# Patient Record
Sex: FEMALE | Race: BLACK OR AFRICAN AMERICAN | Employment: OTHER | URBAN - METROPOLITAN AREA
[De-identification: names, ages, dates, MRNs, and addresses within clinical notes are randomized per-mention and may not be internally consistent; named-entity substitution may affect disease eponyms.]

---

## 2018-09-26 ENCOUNTER — HOSPITAL ENCOUNTER (OUTPATIENT)
Dept: SLEEP MEDICINE | Age: 73
Discharge: HOME | End: 2018-09-26
Attending: PSYCHIATRY & NEUROLOGY
Payer: MEDICARE

## 2018-09-26 DIAGNOSIS — G62.9 POLYNEUROPATHY: ICD-10-CM

## 2018-09-26 DIAGNOSIS — E78.5 HYPERLIPIDEMIA: ICD-10-CM

## 2018-09-26 DIAGNOSIS — K21.9 ESOPHAGEAL REFLUX: ICD-10-CM

## 2018-09-26 DIAGNOSIS — G47.33 OSA (OBSTRUCTIVE SLEEP APNEA): ICD-10-CM

## 2018-09-26 DIAGNOSIS — F32.A DEPRESSION: ICD-10-CM

## 2018-09-26 DIAGNOSIS — E66.09 EXOGENOUS OBESITY: ICD-10-CM

## 2018-09-26 DIAGNOSIS — I10 BENIGN ESSENTIAL HYPERTENSION: ICD-10-CM

## 2018-09-26 DIAGNOSIS — G47.00 PERSISTENT INSOMNIA: ICD-10-CM

## 2018-09-26 DIAGNOSIS — I50.9 CHF (CONGESTIVE HEART FAILURE) (HCC): ICD-10-CM

## 2018-09-26 DIAGNOSIS — J44.9 COPD (CHRONIC OBSTRUCTIVE PULMONARY DISEASE) (HCC): ICD-10-CM

## 2018-09-26 DIAGNOSIS — I25.10 CAD (CORONARY ARTERY DISEASE): ICD-10-CM

## 2018-09-26 DIAGNOSIS — E11.9 TYPE II DIABETES MELLITUS (HCC): ICD-10-CM

## 2018-09-26 DIAGNOSIS — G25.81 RESTLESS LEG SYNDROME: ICD-10-CM

## 2018-09-26 DIAGNOSIS — I48.91 ATRIAL FIBRILLATION (HCC): ICD-10-CM

## 2018-09-26 PROCEDURE — 95811 POLYSOM 6/>YRS CPAP 4/> PARM: CPT

## 2018-09-27 NOTE — PROGRESS NOTES
· Patient arrived for sleep study. · Physician orders were reviewed. · Patient education to include initiation of PAP therapy per protocol. · Patient questions were answered. · The patient demonstrated good understanding of the positive airway pressure device. Patient was not on the schedule until 10/25/2018. Patient says that she was called by Dr. Mary Caldera office to come in [facility]. Patient will be rescheduled to [facility] and seen per Director.

## 2018-09-27 NOTE — PROGRESS NOTES
 Sleep study completed per physician order.  Patient discharged from sleep center.  Patient awake, alert and able to leave the facility under their own power.  Instructed to follow up with their sleep physician for results. Patient had to wait for her ride to come. Left sleep lab later that normal departure time.

## 2018-10-25 ENCOUNTER — HOSPITAL ENCOUNTER (OUTPATIENT)
Dept: SLEEP MEDICINE | Age: 73
Discharge: HOME | End: 2018-10-25
Payer: MEDICARE

## 2020-09-25 ENCOUNTER — HOSPITAL ENCOUNTER (OUTPATIENT)
Dept: PREADMISSION TESTING | Age: 75
Discharge: HOME | End: 2020-09-25
Payer: MEDICARE

## 2020-09-25 DIAGNOSIS — Z20.828 EXPOSURE TO SARS-ASSOCIATED CORONAVIRUS: ICD-10-CM

## 2020-09-25 DIAGNOSIS — Z01.812 BLOOD TESTS PRIOR TO TREATMENT OR PROCEDURE: ICD-10-CM

## 2020-09-25 PROCEDURE — 87635 SARS-COV-2 COVID-19 AMP PRB: CPT

## 2020-09-26 LAB — SARS-COV-2, COV2NT: NOT DETECTED

## 2020-09-30 ENCOUNTER — HOSPITAL ENCOUNTER (OUTPATIENT)
Dept: SLEEP MEDICINE | Age: 75
Discharge: HOME | End: 2020-09-30
Payer: MEDICARE

## 2020-09-30 DIAGNOSIS — I48.91 A-FIB (HCC): ICD-10-CM

## 2020-09-30 DIAGNOSIS — K21.9 ESOPHAGEAL REFLUX: ICD-10-CM

## 2020-09-30 DIAGNOSIS — G47.00 PERSISTENT INSOMNIA: ICD-10-CM

## 2020-09-30 DIAGNOSIS — G47.33 OSA (OBSTRUCTIVE SLEEP APNEA): ICD-10-CM

## 2020-09-30 DIAGNOSIS — I25.10 CAD (CORONARY ARTERY DISEASE): ICD-10-CM

## 2020-09-30 DIAGNOSIS — I10 ESSENTIAL HYPERTENSION, BENIGN: ICD-10-CM

## 2020-09-30 DIAGNOSIS — J44.9 COPD (CHRONIC OBSTRUCTIVE PULMONARY DISEASE) (HCC): ICD-10-CM

## 2020-09-30 DIAGNOSIS — E78.5 HYPERLIPIDEMIA: ICD-10-CM

## 2020-09-30 DIAGNOSIS — I50.9 CHF (CONGESTIVE HEART FAILURE) (HCC): ICD-10-CM

## 2020-09-30 DIAGNOSIS — E11.9 TYPE II DIABETES MELLITUS (HCC): ICD-10-CM

## 2020-09-30 DIAGNOSIS — F32.A DEPRESSION: ICD-10-CM

## 2020-09-30 DIAGNOSIS — G25.81 RESTLESS LEGS SYNDROME: ICD-10-CM

## 2020-09-30 PROCEDURE — 95811 POLYSOM 6/>YRS CPAP 4/> PARM: CPT

## 2020-10-01 VITALS
BODY MASS INDEX: 39.27 KG/M2 | TEMPERATURE: 97.6 F | HEART RATE: 78 BPM | SYSTOLIC BLOOD PRESSURE: 169 MMHG | WEIGHT: 208 LBS | HEIGHT: 61 IN | DIASTOLIC BLOOD PRESSURE: 94 MMHG

## 2024-11-20 ENCOUNTER — OFFICE VISIT (OUTPATIENT)
Age: 79
End: 2024-11-20
Payer: MEDICARE

## 2024-11-20 VITALS
OXYGEN SATURATION: 97 % | SYSTOLIC BLOOD PRESSURE: 128 MMHG | WEIGHT: 201 LBS | DIASTOLIC BLOOD PRESSURE: 60 MMHG | BODY MASS INDEX: 37.95 KG/M2 | HEIGHT: 61 IN

## 2024-11-20 DIAGNOSIS — E78.5 DYSLIPIDEMIA: ICD-10-CM

## 2024-11-20 DIAGNOSIS — I50.32 CHRONIC DIASTOLIC CONGESTIVE HEART FAILURE (HCC): ICD-10-CM

## 2024-11-20 DIAGNOSIS — I10 PRIMARY HYPERTENSION: ICD-10-CM

## 2024-11-20 DIAGNOSIS — I48.91 ATRIAL FIBRILLATION, UNSPECIFIED TYPE (HCC): Primary | ICD-10-CM

## 2024-11-20 DIAGNOSIS — E08.8 DIABETES MELLITUS DUE TO UNDERLYING CONDITION WITH UNSPECIFIED COMPLICATIONS (HCC): ICD-10-CM

## 2024-11-20 PROCEDURE — 93000 ELECTROCARDIOGRAM COMPLETE: CPT | Performed by: INTERNAL MEDICINE

## 2024-11-20 PROCEDURE — 4004F PT TOBACCO SCREEN RCVD TLK: CPT | Performed by: INTERNAL MEDICINE

## 2024-11-20 PROCEDURE — 1123F ACP DISCUSS/DSCN MKR DOCD: CPT | Performed by: INTERNAL MEDICINE

## 2024-11-20 PROCEDURE — 1090F PRES/ABSN URINE INCON ASSESS: CPT | Performed by: INTERNAL MEDICINE

## 2024-11-20 PROCEDURE — 99204 OFFICE O/P NEW MOD 45 MIN: CPT | Performed by: INTERNAL MEDICINE

## 2024-11-20 PROCEDURE — 1126F AMNT PAIN NOTED NONE PRSNT: CPT | Performed by: INTERNAL MEDICINE

## 2024-11-20 PROCEDURE — G8417 CALC BMI ABV UP PARAM F/U: HCPCS | Performed by: INTERNAL MEDICINE

## 2024-11-20 PROCEDURE — 3074F SYST BP LT 130 MM HG: CPT | Performed by: INTERNAL MEDICINE

## 2024-11-20 PROCEDURE — G8428 CUR MEDS NOT DOCUMENT: HCPCS | Performed by: INTERNAL MEDICINE

## 2024-11-20 PROCEDURE — G8400 PT W/DXA NO RESULTS DOC: HCPCS | Performed by: INTERNAL MEDICINE

## 2024-11-20 PROCEDURE — G8484 FLU IMMUNIZE NO ADMIN: HCPCS | Performed by: INTERNAL MEDICINE

## 2024-11-20 PROCEDURE — 3078F DIAST BP <80 MM HG: CPT | Performed by: INTERNAL MEDICINE

## 2024-11-20 RX ORDER — ALBUTEROL SULFATE 90 UG/1
1 INHALANT RESPIRATORY (INHALATION) EVERY 4 HOURS PRN
COMMUNITY
Start: 2024-06-14

## 2024-11-20 RX ORDER — AMIODARONE HYDROCHLORIDE 200 MG/1
200 TABLET ORAL DAILY
COMMUNITY
Start: 2024-01-21

## 2024-11-20 RX ORDER — PHYTONADIONE 5 MG/1
5 TABLET ORAL DAILY
COMMUNITY
Start: 2024-11-05

## 2024-11-20 RX ORDER — BUSPIRONE HYDROCHLORIDE 5 MG/1
1 TABLET ORAL 2 TIMES DAILY PRN
COMMUNITY
Start: 2024-09-19

## 2024-11-20 RX ORDER — PANTOPRAZOLE SODIUM 40 MG/1
40 TABLET, DELAYED RELEASE ORAL 2 TIMES DAILY
COMMUNITY
Start: 2024-09-10

## 2024-11-20 RX ORDER — ACYCLOVIR 800 MG/1
800 TABLET ORAL 4 TIMES DAILY
COMMUNITY
Start: 2024-10-22

## 2024-11-20 RX ORDER — ACETAMINOPHEN 500 MG
1 TABLET ORAL EVERY 6 HOURS PRN
COMMUNITY
Start: 2024-09-19

## 2024-11-20 RX ORDER — ISOSORBIDE MONONITRATE 120 MG/1
120 TABLET, EXTENDED RELEASE ORAL DAILY
COMMUNITY
Start: 2024-01-11

## 2024-11-20 RX ORDER — AMLODIPINE BESYLATE 5 MG/1
1 TABLET ORAL DAILY
COMMUNITY
Start: 2024-09-19

## 2024-11-20 RX ORDER — METOPROLOL TARTRATE 100 MG/1
1 TABLET ORAL 2 TIMES DAILY
COMMUNITY
Start: 2024-09-19

## 2024-11-20 RX ORDER — BUMETANIDE 1 MG/1
1 TABLET ORAL DAILY PRN
COMMUNITY
Start: 2024-09-10

## 2024-11-20 RX ORDER — NITROGLYCERIN 0.4 MG/1
1 TABLET SUBLINGUAL EVERY 5 MIN PRN
COMMUNITY
Start: 2024-09-19

## 2024-11-20 RX ORDER — LACTULOSE 10 G/15ML
SOLUTION ORAL
COMMUNITY
Start: 2024-10-25

## 2024-11-20 RX ORDER — FERROUS SULFATE 325(65) MG
1 TABLET ORAL EVERY OTHER DAY
COMMUNITY
Start: 2024-09-10

## 2024-11-20 RX ORDER — ROSUVASTATIN CALCIUM 10 MG/1
1 TABLET, COATED ORAL DAILY
COMMUNITY
Start: 2024-09-19

## 2024-11-20 RX ORDER — ALLOPURINOL 100 MG/1
1 TABLET ORAL DAILY PRN
COMMUNITY
Start: 2024-09-19

## 2024-11-20 RX ORDER — HYDRALAZINE HYDROCHLORIDE 100 MG/1
100 TABLET, FILM COATED ORAL 3 TIMES DAILY
COMMUNITY
Start: 2024-01-10

## 2024-11-20 NOTE — PROGRESS NOTES
History of Present Illness:  79 year-old female referred by Dr. Clemens for evaluation for atrial fibrillation and consideration for WATCHMAN.  She has had issues with recurrent GI bleed requiring transfusion.  She has also been on Coumadin in the past with labile protimes.  She has chronic heart failure, compensated.  She does feel palpitations.  She has an underlying incomplete left bundle branch block.  She is in atrial fibrillation today with heart rate in the 60s, on Amiodarone.  She is accompanied by her daughter.      Impression:   Symptomatic atrial fibrillation with Amiodarone use, widely variable rates at times, but relatively stable at this point.   History of recurrent GI bleed, hospitalized late August/early September with anemia requiring transfusion.    Currently back on Eliquis, able to tolerate, but intolerance to Coumadin in the past due to labile INRs.    Chronic diastolic heart failure.   Diabetes.   Hypertension.    History of COPD.      Plan:  I reviewed some of her previous echocardiograms.  She does have significant history of pulmonary hypertension, dilated atrium and given her age and history and failure of Amiodarone, overall success with ablation is low, probably less than 60%.   I am most worried about her long term stroke risk and recurrent GI bleed.  We had a lengthy discussion about WATCHMAN.  I would pursue WATCHMAN first and she would like to think about her options and just let me know if she would like to proceed.  She also sees [facility].  All questions were answered.        Wt Readings from Last 3 Encounters:   11/20/24 91.2 kg (201 lb)     No past medical history on file.    Current Outpatient Medications   Medication Sig Dispense Refill    acetaminophen (TYLENOL) 500 MG tablet Take 1 tablet by mouth every 6 hours as needed      acyclovir (ZOVIRAX) 800 MG tablet Take 1 tablet by mouth 4 times daily      albuterol sulfate HFA (PROVENTIL;VENTOLIN;PROAIR) 108 (90 Base)